# Patient Record
Sex: FEMALE | Race: WHITE | NOT HISPANIC OR LATINO | Employment: FULL TIME | URBAN - METROPOLITAN AREA
[De-identification: names, ages, dates, MRNs, and addresses within clinical notes are randomized per-mention and may not be internally consistent; named-entity substitution may affect disease eponyms.]

---

## 2020-09-11 PROBLEM — I47.20 VENTRICULAR TACHYCARDIA: Status: ACTIVE | Noted: 2020-09-11

## 2020-09-11 PROBLEM — E78.5 HYPERLIPIDEMIA: Status: ACTIVE | Noted: 2020-09-11

## 2020-09-11 PROBLEM — I10 ESSENTIAL HYPERTENSION: Status: ACTIVE | Noted: 2020-09-11

## 2020-09-11 PROBLEM — F32.A DEPRESSION: Status: ACTIVE | Noted: 2020-09-11

## 2020-10-06 PROBLEM — I10 ESSENTIAL HYPERTENSION: Status: RESOLVED | Noted: 2020-09-11 | Resolved: 2020-10-06

## 2020-11-29 PROBLEM — Z71.89 ACP (ADVANCE CARE PLANNING): Status: ACTIVE | Noted: 2020-11-29

## 2020-11-29 PROBLEM — I47.20 V TACH: Status: ACTIVE | Noted: 2020-11-29

## 2020-11-30 PROBLEM — I47.29 PAROXYSMAL VENTRICULAR TACHYCARDIA: Status: ACTIVE | Noted: 2020-11-30

## 2020-11-30 PROBLEM — I47.20 PAROXYSMAL VENTRICULAR TACHYCARDIA: Status: ACTIVE | Noted: 2020-11-30

## 2020-12-22 PROBLEM — I47.20 VENTRICULAR TACHYCARDIA: Status: RESOLVED | Noted: 2020-09-11 | Resolved: 2020-12-22

## 2020-12-22 PROBLEM — I47.20 V TACH: Status: RESOLVED | Noted: 2020-11-29 | Resolved: 2020-12-22

## 2021-05-25 PROBLEM — R42 DIZZINESS: Status: ACTIVE | Noted: 2021-05-25

## 2023-06-01 ENCOUNTER — OFFICE VISIT (OUTPATIENT)
Dept: NEUROLOGY | Facility: CLINIC | Age: 73
End: 2023-06-01
Payer: MEDICARE

## 2023-06-01 VITALS
SYSTOLIC BLOOD PRESSURE: 139 MMHG | HEIGHT: 66 IN | BODY MASS INDEX: 29.04 KG/M2 | HEART RATE: 75 BPM | DIASTOLIC BLOOD PRESSURE: 89 MMHG | WEIGHT: 180.69 LBS

## 2023-06-01 DIAGNOSIS — G60.9 HEREDITARY AND IDIOPATHIC NEUROPATHY, UNSPECIFIED: ICD-10-CM

## 2023-06-01 DIAGNOSIS — R79.9 ABNORMAL FINDING OF BLOOD CHEMISTRY, UNSPECIFIED: ICD-10-CM

## 2023-06-01 DIAGNOSIS — R20.2 PARESTHESIA: Primary | ICD-10-CM

## 2023-06-01 DIAGNOSIS — G25.81 RESTLESS LEG: ICD-10-CM

## 2023-06-01 PROCEDURE — 3008F PR BODY MASS INDEX (BMI) DOCUMENTED: ICD-10-PCS | Mod: CPTII,S$GLB,, | Performed by: NURSE PRACTITIONER

## 2023-06-01 PROCEDURE — 3075F SYST BP GE 130 - 139MM HG: CPT | Mod: CPTII,S$GLB,, | Performed by: NURSE PRACTITIONER

## 2023-06-01 PROCEDURE — 1101F PR PT FALLS ASSESS DOC 0-1 FALLS W/OUT INJ PAST YR: ICD-10-PCS | Mod: CPTII,S$GLB,, | Performed by: NURSE PRACTITIONER

## 2023-06-01 PROCEDURE — 1160F RVW MEDS BY RX/DR IN RCRD: CPT | Mod: CPTII,S$GLB,, | Performed by: NURSE PRACTITIONER

## 2023-06-01 PROCEDURE — 99999 PR PBB SHADOW E&M-EST. PATIENT-LVL IV: ICD-10-PCS | Mod: PBBFAC,,, | Performed by: NURSE PRACTITIONER

## 2023-06-01 PROCEDURE — 99999 PR PBB SHADOW E&M-EST. PATIENT-LVL IV: CPT | Mod: PBBFAC,,, | Performed by: NURSE PRACTITIONER

## 2023-06-01 PROCEDURE — 1126F AMNT PAIN NOTED NONE PRSNT: CPT | Mod: CPTII,S$GLB,, | Performed by: NURSE PRACTITIONER

## 2023-06-01 PROCEDURE — 1159F MED LIST DOCD IN RCRD: CPT | Mod: CPTII,S$GLB,, | Performed by: NURSE PRACTITIONER

## 2023-06-01 PROCEDURE — 3079F DIAST BP 80-89 MM HG: CPT | Mod: CPTII,S$GLB,, | Performed by: NURSE PRACTITIONER

## 2023-06-01 PROCEDURE — 3008F BODY MASS INDEX DOCD: CPT | Mod: CPTII,S$GLB,, | Performed by: NURSE PRACTITIONER

## 2023-06-01 PROCEDURE — 1160F PR REVIEW ALL MEDS BY PRESCRIBER/CLIN PHARMACIST DOCUMENTED: ICD-10-PCS | Mod: CPTII,S$GLB,, | Performed by: NURSE PRACTITIONER

## 2023-06-01 PROCEDURE — 1126F PR PAIN SEVERITY QUANTIFIED, NO PAIN PRESENT: ICD-10-PCS | Mod: CPTII,S$GLB,, | Performed by: NURSE PRACTITIONER

## 2023-06-01 PROCEDURE — 1159F PR MEDICATION LIST DOCUMENTED IN MEDICAL RECORD: ICD-10-PCS | Mod: CPTII,S$GLB,, | Performed by: NURSE PRACTITIONER

## 2023-06-01 PROCEDURE — 1101F PT FALLS ASSESS-DOCD LE1/YR: CPT | Mod: CPTII,S$GLB,, | Performed by: NURSE PRACTITIONER

## 2023-06-01 PROCEDURE — 99203 OFFICE O/P NEW LOW 30 MIN: CPT | Mod: S$GLB,,, | Performed by: NURSE PRACTITIONER

## 2023-06-01 PROCEDURE — 3288F PR FALLS RISK ASSESSMENT DOCUMENTED: ICD-10-PCS | Mod: CPTII,S$GLB,, | Performed by: NURSE PRACTITIONER

## 2023-06-01 PROCEDURE — 3075F PR MOST RECENT SYSTOLIC BLOOD PRESS GE 130-139MM HG: ICD-10-PCS | Mod: CPTII,S$GLB,, | Performed by: NURSE PRACTITIONER

## 2023-06-01 PROCEDURE — 99203 PR OFFICE/OUTPT VISIT, NEW, LEVL III, 30-44 MIN: ICD-10-PCS | Mod: S$GLB,,, | Performed by: NURSE PRACTITIONER

## 2023-06-01 PROCEDURE — 3288F FALL RISK ASSESSMENT DOCD: CPT | Mod: CPTII,S$GLB,, | Performed by: NURSE PRACTITIONER

## 2023-06-01 PROCEDURE — 3079F PR MOST RECENT DIASTOLIC BLOOD PRESSURE 80-89 MM HG: ICD-10-PCS | Mod: CPTII,S$GLB,, | Performed by: NURSE PRACTITIONER

## 2023-06-01 RX ORDER — PREDNISOLONE ACETATE 10 MG/ML
SUSPENSION/ DROPS OPHTHALMIC
COMMUNITY
Start: 2023-05-23 | End: 2023-09-29

## 2023-06-01 RX ORDER — IPRATROPIUM BROMIDE 42 UG/1
SPRAY, METERED NASAL
COMMUNITY
Start: 2023-01-18

## 2023-06-01 NOTE — PATIENT INSTRUCTIONS
EMG/NCS stands for electromyogram and nerve conduction studies. And this is an electrical test of your nerves and muscles. The purpose of the test is to localize where your symptoms are coming from. That can be pain, any kind of numbness and tingling, and/or weakness      Neuropathy is the medical term for nerve damage. Neuropathy is a common complication of type 1 and type 2 diabetes; up to 26 percent of people with type 2 diabetes have evidence of nerve damage at the time that diabetes is diagnosed.  A generalized type of neuropathy, known as polyneuropathy, is the most common type of diabetic neuropathy. Other types of neuropathy can also affect people with diabetes, but will not be discussed here.    Signs and symptoms of diabetic neuropathy include loss of sensation and/or burning pain in the feet. Early detection of diabetes and tight control of blood sugar levels may reduce the risk of developing diabetic neuropathy.    Treatments for diabetic neuropathy are available, and include several elements: control of blood glucose levels, prevention of injury, and control of painful symptoms.        Causes  Peripheral neuropathy is nerve damage caused by a number of different conditions. Health conditions that can cause peripheral neuropathy include:    Autoimmune diseases. These include Sjogren's syndrome, lupus, rheumatoid arthritis, Guillain-[place] syndrome, chronic inflammatory demyelinating polyneuropathy and vasculitis.  Diabetes. This is the most common cause. Among people with diabetes, more than halfwill develop some type of neuropathy.  Infections. These include certain viral or bacterial infections, including Lyme disease, shingles, Mik-Barr virus, hepatitis B and C, leprosy, diphtheria, and HIV.  Inherited disorders. Disorders such as Charcot-Elise-Tooth disease are hereditary types of neuropathy.  Tumors. Growths, cancerous (malignant) and noncancerous (benign), can develop on the nerves or press on  nerves. Also, polyneuropathy can arise as a result of some cancers related to the body's immune response. These are a form of a degenerative disorder called paraneoplastic syndrome.  Bone marrow disorders. These include an abnormal protein in the blood (monoclonal gammopathies), a form of bone cancer (myeloma), lymphoma and the rare disease amyloidosis.  Other diseases. These include kidney disease, liver disease, connective tissue disorders and an underactive thyroid (hypothyroidism).  Other causes of neuropathies include:    Alcoholism. Poor dietary choices made by people with alcoholism can lead to vitamin deficiencies.  Exposure to poisons. Toxic substances include industrial chemicals and heavy metals such as lead and mercury.  Medications. Certain medications, especially those used to treat cancer (chemotherapy), can cause peripheral neuropathy.  Injury or pressure on the nerve. Injuries, such as from motor vehicle accidents, falls or sports injuries, can sever or damage peripheral nerves. Nerve pressure can result from having a cast or using crutches or repeating a motion such as typing many times.  Vitamin deficiencies. B vitamins -- including B-1, B-6 and B-12 -- vitamin E and niacin are crucial to nerve health.

## 2023-06-01 NOTE — PROGRESS NOTES
NEUROLOGY  Outpatient CONSULT    [facility]  [address] [facility], [place] [address]  [phone] (office) / [phone] (fax)    Patient Name:  Jenise Montoya  :  1950  MR #:  [id number]  Acct #:  [id number]    Date of Neurology Consult: 2023  Name of Provider: AVE Perea    Other Physicians:  Mara Shaikh, PhD (Primary Care Physician); Idris Bardales III, MD (Referring)      Chief Complaint: Peripheral Neuropathy      History of Present Illness (HPI):  Jenise Montoya is a right handed 72 y.o. female with a PMHX ventricular tachy, HLD, Depression, Acid reflux    Patient is here today for burning sensation to both feet. This started roughly 3-4 months ago and at times will travel up to her lower legs. There is no associated weakness or falls. Her symptoms are worse in the mornings. Once she moves around she feels OK. This is bothersome at night.   She does endorse back pain fro the last 2 years. The pain does shoot down both legs. She is getting RACHNA next Friday. She has tried Gabapentin in the past but this made her sickly. She is currently not on any medication for this.     She denies history of autoimmune diseases, diabetes, alcoholism, smoking, viral/bacterial infections, family history of neuropathy, chemotherapy use, bone marrow disorders, kidney/liver diseases, underactive thyroid, vitamin deficiencies.   She did break her right ankle in . She did have surgery and recovered well.     She also has noticed the need to move her legs when sitting or mainly at night when laying down. This does not keep her awake. She reports it feels as though creepy crawling on her skin. Onset 3-4 mths ago.         Past Medical, Surgical, Family & Social History:   Past Medical History:   Diagnosis Date    Acid reflux     Depression     High cholesterol     Ventricular tachycardia      Past Surgical History:   Procedure Laterality Date    ANKLE FRACTURE SURGERY Right  2000    CARDIAC ELECTROPHYSIOLOGY STUDY  09/14/2020    Procedure: Study possible ablation;  Surgeon: Idris Bardales III, MD;  Location: [facility] include 234 beds at the [facility];  Service: Cardiology;;    CHOLECYSTECTOMY      FRACTURE SURGERY      HAND SURGERY      HAND SURGERY      NASAL FRACTURE SURGERY      NASAL SEPTUM SURGERY       Family History   Problem Relation Age of Onset    Heart disease Mother     Hypertension Mother     Diabetes Brother      Alcohol use:  reports no history of alcohol use.   (Of note, 0.6 oz = 1 beer or 6 oz = 10 beers).  Tobacco use:  reports that she has never smoked. She has never used smokeless tobacco.  Street drug use:  reports no history of drug use.  Allergies: Codeine sulfate.    Home Medications:     Current Outpatient Medications:     amiodarone (PACERONE) 200 MG Tab, TAKE 1 TABLET BY MOUTH EVERY DAY, Disp: 90 tablet, Rfl: 3    aspirin (ECOTRIN) 81 MG EC tablet, Take 1 tablet (81 mg total) by mouth once daily., Disp:  , Rfl:     atorvastatin (LIPITOR) 20 MG tablet, Take 20 mg by mouth once daily., Disp: , Rfl:     fluticasone propionate (FLONASE) 50 mcg/actuation nasal spray, 1 spray by Each Nostril route daily as needed for Allergies. , Disp: , Rfl:     multivit-min/iron/folic/lutein (CENTRUM SILVER WOMEN ORAL), Take 1 tablet by mouth once daily., Disp: , Rfl:     omeprazole (PRILOSEC) 40 MG capsule, Take 40 mg by mouth once daily., Disp: , Rfl:     prednisoLONE acetate (PRED FORTE) 1 % DrpS, Place into both eyes., Disp: , Rfl:     UNABLE TO FIND, Take 75 mg by mouth once daily. medication name: Shahzad, Disp: , Rfl:     venlafaxine (EFFEXOR-XR) 37.5 MG 24 hr capsule, Take 37.5 mg by mouth once daily., Disp: , Rfl:     verapamiL (VERELAN) 240 MG C24P, TAKE 1 CAPSULE BY MOUTH ONCE DAILY, Disp: 30 capsule, Rfl: 11    ipratropium (ATROVENT) 42 mcg (0.06 %) nasal spray, by Each Nostril route., Disp: , Rfl:     ondansetron (ZOFRAN-ODT) 8 MG TbDL, , Disp: , Rfl:     pregabalin (LYRICA) 50 MG capsule, Take 1  "capsule (50 mg total) by mouth 3 (three) times daily., Disp: 90 capsule, Rfl: 3    Physical Examination:  /89 (BP Location: Right arm, Patient Position: Sitting, BP Method: Medium (Automatic))   Pulse 75   Ht 5' 6" (1.676 m)   Wt 81.9 kg (180 lb 10.7 oz)   BMI 29.16 kg/m²     GENERAL:  General appearance: Well, non-toxic appearing.  No apparent distress.  Neck: supple.  .    MENTAL STATUS:  Alertness, attention span & concentration: normal.  Language: normal.  Orientation to self, place & time:  normal.  Memory, recent & remote: normal.  Fund of knowledge: normal.    SPEECH:  Clear and fluent.  Follows complex commands.      CRANIAL NERVES:  Cranial Nerves II-XII were examined.  II - Visual fields: normal.  III, IV, VI: PERRL, EOMI, No ptosis, No nystagmus.  V - Facial sensation: normal.  VII - Face symmetry & mobility: normal.  VIII - Hearing: normal  IX, X - Palate: mobile & midline.  XI - Shoulder shrug: normal.  XII - Tongue protrusion: normal.        GROSS MOTOR:  Gait & station: non focal; unable to perform tandem   Tone: normal.  Abnormal movements: none.  Finger-nose: normal.  Rapid alternating movements: normal.  Pronator drift: normal      MUSCLE STRENGTH:   Hand grasp:   - right:5/5   - left:5/5    RIGHT    LEFT   5 Neck Ext. 5   5 Neck Flex 5   5 Deltoids 5   5 Biceps 5   5 Triceps 5   5 Forearm.Pr. 5        5 Iliopsoas flex    5   5 Hip Abduct 5   5 Hip Adduct 5   5 Quads 5   5 Hams 5   5 Dorsiflex 5   5 Plantar Flex 5     REFLEXES:    RIGHT Reflex   LEFT   2+ Biceps 2+   2+ Brachiorad. 2+        3+ Patellar 2+      Left  Right    Ko absent  absent   Clonus Absent Absent   Babinski Absent Absent       SENSORY:  Light touch: Normal throughout.   Sharp touch: Normal throughout.  Vibration: decreased to right foot and ankle  Temperature: Normal throughout.   Joint Position: Normal throughout.  Proprioception: Intact      Diagnostic Data Reviewed:     Component      Latest Ref Rng & Units " 5/26/2021   Sodium      136 - 145 mmol/L 144   Potassium      3.5 - 5.1 mmol/L 4.2   Chloride      95 - 110 mmol/L 105   CO2      22 - 31 mmol/L 33 (H)   Glucose      70 - 110 mg/dL 116 (H)   BUN      7 - 18 mg/dL 18   Creatinine      0.50 - 1.40 mg/dL 1.04   Calcium      8.4 - 10.2 mg/dL 9.6   PROTEIN TOTAL      6.0 - 8.4 g/dL 6.5   Albumin      3.5 - 5.2 g/dL 4.0   BILIRUBIN TOTAL      0.2 - 1.3 mg/dL 0.3   Alkaline Phosphatase      38 - 145 U/L 120   AST      14 - 36 U/L 25   ALT      0 - 35 U/L 21   Anion Gap      8 - 16 mmol/L 6 (L)   eGFR if African American      >60 mL/min/1.73 m:2 >60   eGFR if non African American      >60 mL/min/1.73 m:2 54 (A)   Cholesterol      120 - 199 mg/dL 172   Triglycerides      30 - 150 mg/dL 96   HDL      40 - 75 mg/dL 72   LDL Cholesterol External      63.0 - 159.0 mg/dL 80.8   HDL/Cholesterol Ratio      20.0 - 50.0 % 41.9   Total Cholesterol/HDL Ratio      2.0 - 5.0 2.4   Non-HDL Cholesterol      mg/dL 100   TSH      0.400 - 4.000 uIU/mL 1.700               Assessment and Plan:  Jenise Montoya is a 72 y.o. female.    Problem List Items Addressed This Visit          Neuro    Restless leg    Current Assessment & Plan     Pt reports urge to move both legs when at rest. Moving around with offer relief.   This has not caused sleep disturbances  Obtain ferritin level   - if < 75 then plan to supplement with oral iron  Trial of Pregabalin may also offer aid.             Other    Paresthesia - Primary    Current Assessment & Plan     Patient is a 73 y/o female that presents for burning sensation. It is to bilateral feet with onset ~ 3-4 mths ago. It is worse at rest and improve when moving around. There is no associated weakness, falls etc.   Suspect neuropathy vs radiculopathy  Etiology unknown  Pt with h/o chronic back pain and actively following PM  Neuro exam with sensory deficit to right foot and ankle; strength intact  She has been trid on Gabapentin in the past for back pain  but this made her sick   - trial of Lyrica 50 mg TID; S/E discussed   - also try OTC creams  Obtain EMG/NCS and serologies           Other Visit Diagnoses       Hereditary and idiopathic neuropathy, unspecified        Abnormal finding of blood chemistry, unspecified                                Important to note, also  has a past medical history of Acid reflux, Depression, High cholesterol, and Ventricular tachycardia.            The patient will return to clinic in 2-3 months after testing        All questions were answered and patient is comfortable with the plan.       Thank you very much for the opportunity to assist in this patient's care.    If you have any questions or concerns, please do not hesitate to contact me at any time.    Sincerely,     AVE Perea  [facility] [place]         I spent a total of 39 minutes on the day of the visit.This includes face to face time and non-face to face time preparing to see the patient (eg, review of tests), Obtaining and/or reviewing separately obtained history, Documenting clinical information in the electronic or other health record, Independently interpreting resultsand communicating results to the patient/family/caregiver, or Care coordination.

## 2023-06-02 ENCOUNTER — LAB VISIT (OUTPATIENT)
Dept: LAB | Facility: HOSPITAL | Age: 73
End: 2023-06-02
Attending: NURSE PRACTITIONER
Payer: MEDICARE

## 2023-06-02 DIAGNOSIS — R20.2 PARESTHESIA: ICD-10-CM

## 2023-06-02 DIAGNOSIS — G25.81 RESTLESS LEG: ICD-10-CM

## 2023-06-02 DIAGNOSIS — G60.9 HEREDITARY AND IDIOPATHIC NEUROPATHY, UNSPECIFIED: ICD-10-CM

## 2023-06-02 DIAGNOSIS — R79.9 ABNORMAL FINDING OF BLOOD CHEMISTRY, UNSPECIFIED: ICD-10-CM

## 2023-06-02 LAB
BASOPHILS # BLD AUTO: 0.05 K/UL (ref 0–0.2)
BASOPHILS NFR BLD: 0.6 % (ref 0–1.9)
DIFFERENTIAL METHOD: ABNORMAL
EOSINOPHIL # BLD AUTO: 0.3 K/UL (ref 0–0.5)
EOSINOPHIL NFR BLD: 3.7 % (ref 0–8)
ERYTHROCYTE [DISTWIDTH] IN BLOOD BY AUTOMATED COUNT: 15.2 % (ref 11.5–14.5)
FERRITIN SERPL-MCNC: 17 NG/ML (ref 20–300)
HCT VFR BLD AUTO: 40.5 % (ref 37–48.5)
HGB BLD-MCNC: 12.8 G/DL (ref 12–16)
IMM GRANULOCYTES # BLD AUTO: 0.03 K/UL (ref 0–0.04)
IMM GRANULOCYTES NFR BLD AUTO: 0.4 % (ref 0–0.5)
LYMPHOCYTES # BLD AUTO: 2.2 K/UL (ref 1–4.8)
LYMPHOCYTES NFR BLD: 28.3 % (ref 18–48)
MCH RBC QN AUTO: 28.8 PG (ref 27–31)
MCHC RBC AUTO-ENTMCNC: 31.6 G/DL (ref 32–36)
MCV RBC AUTO: 91 FL (ref 82–98)
MONOCYTES # BLD AUTO: 0.6 K/UL (ref 0.3–1)
MONOCYTES NFR BLD: 7.3 % (ref 4–15)
NEUTROPHILS # BLD AUTO: 4.7 K/UL (ref 1.8–7.7)
NEUTROPHILS NFR BLD: 59.7 % (ref 38–73)
NRBC BLD-RTO: 0 /100 WBC
PATH REV BLD -IMP: NORMAL
PLATELET # BLD AUTO: 302 K/UL (ref 150–450)
PMV BLD AUTO: 11.9 FL (ref 9.2–12.9)
RBC # BLD AUTO: 4.45 M/UL (ref 4–5.4)
VIT B12 SERPL-MCNC: 431 PG/ML (ref 210–950)
WBC # BLD AUTO: 7.81 K/UL (ref 3.9–12.7)

## 2023-06-02 PROCEDURE — 85060 PATHOLOGIST REVIEW: ICD-10-PCS | Mod: ,,, | Performed by: PATHOLOGY

## 2023-06-02 PROCEDURE — 86235 NUCLEAR ANTIGEN ANTIBODY: CPT | Performed by: NURSE PRACTITIONER

## 2023-06-02 PROCEDURE — 85025 COMPLETE CBC W/AUTO DIFF WBC: CPT | Performed by: NURSE PRACTITIONER

## 2023-06-02 PROCEDURE — 84446 ASSAY OF VITAMIN E: CPT | Performed by: NURSE PRACTITIONER

## 2023-06-02 PROCEDURE — 85060 BLOOD SMEAR INTERPRETATION: CPT | Mod: ,,, | Performed by: PATHOLOGY

## 2023-06-02 PROCEDURE — 82607 VITAMIN B-12: CPT | Performed by: NURSE PRACTITIONER

## 2023-06-02 PROCEDURE — 86334 IMMUNOFIX E-PHORESIS SERUM: CPT | Mod: 26,,, | Performed by: PATHOLOGY

## 2023-06-02 PROCEDURE — 86334 PATHOLOGIST INTERPRETATION IFE: ICD-10-PCS | Mod: 26,,, | Performed by: PATHOLOGY

## 2023-06-02 PROCEDURE — 84425 ASSAY OF VITAMIN B-1: CPT | Performed by: NURSE PRACTITIONER

## 2023-06-02 PROCEDURE — 86235 NUCLEAR ANTIGEN ANTIBODY: CPT | Mod: 59 | Performed by: NURSE PRACTITIONER

## 2023-06-02 PROCEDURE — 84630 ASSAY OF ZINC: CPT | Performed by: NURSE PRACTITIONER

## 2023-06-02 PROCEDURE — 84207 ASSAY OF VITAMIN B-6: CPT | Performed by: NURSE PRACTITIONER

## 2023-06-02 PROCEDURE — 82728 ASSAY OF FERRITIN: CPT | Performed by: NURSE PRACTITIONER

## 2023-06-02 PROCEDURE — 86334 IMMUNOFIX E-PHORESIS SERUM: CPT | Performed by: NURSE PRACTITIONER

## 2023-06-02 PROCEDURE — 36415 COLL VENOUS BLD VENIPUNCTURE: CPT | Mod: PO | Performed by: NURSE PRACTITIONER

## 2023-06-02 PROCEDURE — 84165 PROTEIN E-PHORESIS SERUM: CPT | Performed by: NURSE PRACTITIONER

## 2023-06-02 PROCEDURE — 84165 PATHOLOGIST INTERPRETATION SPE: ICD-10-PCS | Mod: 26,,, | Performed by: PATHOLOGY

## 2023-06-02 PROCEDURE — 83921 ORGANIC ACID SINGLE QUANT: CPT | Performed by: NURSE PRACTITIONER

## 2023-06-02 PROCEDURE — 86038 ANTINUCLEAR ANTIBODIES: CPT | Performed by: NURSE PRACTITIONER

## 2023-06-02 PROCEDURE — 84165 PROTEIN E-PHORESIS SERUM: CPT | Mod: 26,,, | Performed by: PATHOLOGY

## 2023-06-02 PROCEDURE — 86039 ANTINUCLEAR ANTIBODIES (ANA): CPT | Performed by: NURSE PRACTITIONER

## 2023-06-02 RX ORDER — PREGABALIN 50 MG/1
50 CAPSULE ORAL 3 TIMES DAILY
Qty: 90 CAPSULE | Refills: 3 | Status: SHIPPED | OUTPATIENT
Start: 2023-06-02 | End: 2023-09-29

## 2023-06-02 NOTE — ASSESSMENT & PLAN NOTE
Pt reports urge to move both legs when at rest. Moving around with offer relief.   This has not caused sleep disturbances  Obtain ferritin level   - if < 75 then plan to supplement with oral iron  Trial of Pregabalin may also offer aid.

## 2023-06-02 NOTE — ASSESSMENT & PLAN NOTE
Patient is a 73 y/o female that presents for burning sensation. It is to bilateral feet with onset ~ 3-4 mths ago. It is worse at rest and improve when moving around. There is no associated weakness, falls etc.   Suspect neuropathy vs radiculopathy  Etiology unknown  Pt with h/o chronic back pain and actively following PM  Neuro exam with sensory deficit to right foot and ankle; strength intact  She has been trid on Gabapentin in the past for back pain but this made her sick   - trial of Lyrica 50 mg TID; S/E discussed   - also try OTC creams  Obtain EMG/NCS and serologies

## 2023-06-05 LAB
ALBUMIN SERPL ELPH-MCNC: 3.94 G/DL (ref 3.35–5.55)
ALPHA1 GLOB SERPL ELPH-MCNC: 0.3 G/DL (ref 0.17–0.41)
ALPHA2 GLOB SERPL ELPH-MCNC: 0.94 G/DL (ref 0.43–0.99)
ANA PATTERN 1: NORMAL
ANA SER QL IF: POSITIVE
ANA TITR SER IF: NORMAL {TITER}
B-GLOBULIN SERPL ELPH-MCNC: 0.94 G/DL (ref 0.5–1.1)
GAMMA GLOB SERPL ELPH-MCNC: 0.78 G/DL (ref 0.67–1.58)
INTERPRETATION SERPL IFE-IMP: NORMAL
PATH REV BLD -IMP: NORMAL
PROT SERPL-MCNC: 6.9 G/DL (ref 6–8.4)
ZINC SERPL-MCNC: 113 UG/DL (ref 60–130)

## 2023-06-06 LAB
ANTI SM ANTIBODY: 0.1 RATIO (ref 0–0.99)
ANTI SM/RNP ANTIBODY: 0.13 RATIO (ref 0–0.99)
ANTI-SM INTERPRETATION: NEGATIVE
ANTI-SM/RNP INTERPRETATION: NEGATIVE
ANTI-SSA ANTIBODY: 0.09 RATIO (ref 0–0.99)
ANTI-SSA ANTIBODY: 0.09 RATIO (ref 0–0.99)
ANTI-SSA INTERPRETATION: NEGATIVE
ANTI-SSA INTERPRETATION: NEGATIVE
ANTI-SSB ANTIBODY: 0.06 RATIO (ref 0–0.99)
ANTI-SSB ANTIBODY: 0.06 RATIO (ref 0–0.99)
ANTI-SSB INTERPRETATION: NEGATIVE
ANTI-SSB INTERPRETATION: NEGATIVE
DSDNA AB SER-ACNC: NORMAL [IU]/ML
METHYLMALONATE SERPL-SCNC: 0.29 UMOL/L
PATHOLOGIST INTERPRETATION IFE: NORMAL
PATHOLOGIST INTERPRETATION SPE: NORMAL
VIT B1 BLD-MCNC: 81 UG/L (ref 38–122)

## 2023-06-07 ENCOUNTER — TELEPHONE (OUTPATIENT)
Dept: NEUROLOGY | Facility: CLINIC | Age: 73
End: 2023-06-07
Payer: MEDICARE

## 2023-06-07 ENCOUNTER — TELEPHONE (OUTPATIENT)
Dept: HEMATOLOGY/ONCOLOGY | Facility: CLINIC | Age: 73
End: 2023-06-07
Payer: MEDICARE

## 2023-06-07 DIAGNOSIS — R79.0 LOW FERRITIN LEVEL: Primary | ICD-10-CM

## 2023-06-07 LAB
A-TOCOPHEROL VIT E SERPL-MCNC: 1346 UG/DL (ref 500–1800)
PYRIDOXAL SERPL-MCNC: 9 UG/L (ref 5–50)

## 2023-06-07 NOTE — TELEPHONE ENCOUNTER
----- Message from MORENO Villar sent at 6/7/2023 12:41 PM CDT -----  Please inform the patient that her Iron level is low. Id like her to start Iron supplements but also would like for her to see a hematologist to explore why this is.   Please start taking Ferrous sulfate 325 mg 1 tablet daily. This can be purchased over the counter.  Referral placed for hematology

## 2023-06-07 NOTE — TELEPHONE ENCOUNTER
Called pt and discussed hem referral.  Pt stated she will try the OTC iron supplements for a few months to see if this helps. Decline hem appt at this time.

## 2023-07-10 ENCOUNTER — OFFICE VISIT (OUTPATIENT)
Dept: PHYSICAL MEDICINE AND REHAB | Facility: CLINIC | Age: 73
End: 2023-07-10
Payer: MEDICARE

## 2023-07-10 VITALS — WEIGHT: 180.56 LBS | BODY MASS INDEX: 29.02 KG/M2 | HEIGHT: 66 IN

## 2023-07-10 DIAGNOSIS — R20.2 PARESTHESIA: ICD-10-CM

## 2023-07-10 DIAGNOSIS — M54.9 BACK PAIN, UNSPECIFIED BACK LOCATION, UNSPECIFIED BACK PAIN LATERALITY, UNSPECIFIED CHRONICITY: ICD-10-CM

## 2023-07-10 PROCEDURE — 95886 PR EMG COMPLETE, W/ NERVE CONDUCTION STUDIES, 5+ MUSCLES: ICD-10-PCS | Mod: S$GLB,,, | Performed by: PHYSICAL MEDICINE & REHABILITATION

## 2023-07-10 PROCEDURE — 99999 PR PBB SHADOW E&M-EST. PATIENT-LVL II: ICD-10-PCS | Mod: PBBFAC,,, | Performed by: PHYSICAL MEDICINE & REHABILITATION

## 2023-07-10 PROCEDURE — 99999 PR PBB SHADOW E&M-EST. PATIENT-LVL II: CPT | Mod: PBBFAC,,, | Performed by: PHYSICAL MEDICINE & REHABILITATION

## 2023-07-10 PROCEDURE — 95910 NRV CNDJ TEST 7-8 STUDIES: CPT | Mod: S$GLB,,, | Performed by: PHYSICAL MEDICINE & REHABILITATION

## 2023-07-10 PROCEDURE — 95910 PR NERVE CONDUCTION STUDY; 7-8 STUDIES: ICD-10-PCS | Mod: S$GLB,,, | Performed by: PHYSICAL MEDICINE & REHABILITATION

## 2023-07-10 PROCEDURE — 99499 UNLISTED E&M SERVICE: CPT | Mod: S$GLB,,, | Performed by: PHYSICAL MEDICINE & REHABILITATION

## 2023-07-10 PROCEDURE — 95886 MUSC TEST DONE W/N TEST COMP: CPT | Mod: S$GLB,,, | Performed by: PHYSICAL MEDICINE & REHABILITATION

## 2023-07-10 PROCEDURE — 99499 NO LOS: ICD-10-PCS | Mod: S$GLB,,, | Performed by: PHYSICAL MEDICINE & REHABILITATION

## 2023-07-10 NOTE — PROGRESS NOTES
[facility]  [address] [place] [address]             Full Name: Jenise Montoya Gender: Female  Patient ID: [id number] YOB: 1950  History: Bilateral foot burning sensations. Chronic back pain. No hx of back surgery.       Visit Date: 7/10/2023 11:19 AM  Age: 72 Years  Examining Physician: Matilda Briscoe DO  Referring Physician: Maryann Dennis      Sensory NCS      Nerve / Sites Rec. Site Onset Lat Peak Lat NP Amp PP Amp Segments Distance Velocity     ms ms µV µV  cm m/s   R Sural - Ankle (Calf)      Calf Ankle 3.46 4.10 9.7 8.5 Calf - Ankle 14 40   L Sural - Ankle (Calf)      Calf Ankle 3.23 4.19 8.2 8.0 Calf - Ankle 14 43   L Superficial peroneal - Ankle      Lat leg Ankle 3.10 3.96 6.0 3.0 Lat leg - Ankle 14 45   R Superficial peroneal - Ankle      Lat leg Ankle 3.40 4.02 6.2 8.2 Lat leg - Ankle 14 41       Motor NCS      Nerve / Sites Muscle Latency Amplitude Amp % Duration Segments Distance Lat Diff Velocity     ms mV % ms  cm ms m/s   L Peroneal - EDB      Ankle EDB 5.46 3.2 100 6.21 Ankle - EDB 8        Fib head EDB 12.23 3.0 92 6.31 Fib head - Ankle 30 6.77 44      Pop fossa EDB 13.56 3.0 92.5 6.29 Pop fossa - Fib head 7 1.33 52   R Peroneal - EDB      Ankle EDB 4.81 3.8 100 6.08 Ankle - EDB 8        Fib head EDB 11.48 3.1 81.8 6.85 Fib head - Ankle 28 6.67 42      Pop fossa EDB 12.98 3.3 86.3 6.79 Pop fossa - Fib head 8 1.50 53   R Tibial - AH      Ankle AH 3.79 6.8 100 4.67 Ankle - AH 8        Pop fossa AH 12.56 5.0 73.7 4.94 Pop fossa - Ankle 38 8.77 43   L Tibial - AH      Ankle AH 3.50 9.3 100 4.92 Ankle - AH 8        Pop fossa AH 12.38 6.9 73.9 5.29 Pop fossa - Ankle 39 8.88 44       EMG Summary Table     Spontaneous MUAP Recruitment   Muscle IA Fib PSW Fasc CRD Amp Dur. Poly Pattern   L. Quadriceps N None None None None N N None N   L. Tibialis anterior N None None None None N N None N   L. Peroneus longus N None None None None N N None N   L. Gastrocnemius  (Medial head) N None None None None N N None N   L. Gluteus medius N None None None None N N None N   R. Quadriceps N None None None None N N None N   R. Tibialis anterior N None None None None N N None N   R. Peroneus longus N None None None None N N None N   R. Gastrocnemius (Medial head) N None None None None N N None N   R. Gluteus medius N None None None None N N None N       Summary    The motor conduction test was normal in all 4 of the tested nerves: L Peroneal - EDB, R Peroneal - EDB, R Tibial - AH, L Tibial - AH.    The sensory conduction test was normal in all 4 of the tested nerves: R Sural - Ankle (Calf), L Sural - Ankle (Calf), L Superficial peroneal - Ankle, R Superficial peroneal - Ankle.    The needle EMG study was normal in all 10 tested muscles: L. Quadriceps, L. Tibialis anterior, L. Peroneus longus, L. Gastrocnemius (Medial head), L. Gluteus medius, R. Quadriceps, R. Tibialis anterior, R. Peroneus longus, R. Gastrocnemius (Medial head), R. Gluteus medius.       Impression:  Normal study.  No electrophysiologic evidence of bilateral lumbosacral radiculopathy/plexopathy, bilateral tibial mononeuropathy, bilateral peroneal mononeuropathy, or peripheral neuropathy in the lower extremities.    ------------------------------  Matilda Briscoe, DO

## 2023-12-26 ENCOUNTER — TELEPHONE (OUTPATIENT)
Dept: NEUROLOGY | Facility: CLINIC | Age: 73
End: 2023-12-26
Payer: MEDICARE

## 2023-12-26 NOTE — TELEPHONE ENCOUNTER
----- Message from Jayden Caputo sent at 12/26/2023 10:05 AM CST -----  Type:  Sooner Appointment Request    Caller is requesting a sooner appointment.  Caller declined first available appointment listed below.  Caller will not accept being placed on the waitlist and is requesting a message be sent to doctor.    Name of Caller:  pt  When is the first available appointment?   NA  Symptoms:  tested positive for ANDREIA  Would the patient rather a call back or a response via [facility]?  Call back  Best Call Back Number:   [phone]  Additional Information:  pt pcp advised her to schedule an appt, pl call bk to advise thanks

## 2023-12-26 NOTE — TELEPHONE ENCOUNTER
Called and spoke to pt.  She stated her ANDREIA labs were abnormal and her provider informed her to see hematologist.  Labs were done in June when the hem referral was created.  Scheduled benign hem appt for Mar 7th, first avail hem appt; pt confirmed the appt date time and location.     E-Consult info sent to referring physician, Maryann Dennis NP, Neuro.

## 2024-04-12 PROBLEM — I35.8 SYSTOLIC MURMUR OF AORTA: Status: ACTIVE | Noted: 2024-04-12

## 2024-04-12 PROBLEM — R42 DIZZINESS: Status: RESOLVED | Noted: 2021-05-25 | Resolved: 2024-04-12

## 2024-07-26 DIAGNOSIS — U07.1 COVID-19 VIRUS DETECTED: ICD-10-CM

## 2025-06-18 ENCOUNTER — OFFICE VISIT (OUTPATIENT)
Dept: OPHTHALMOLOGY | Facility: CLINIC | Age: 75
End: 2025-06-18
Payer: MEDICARE

## 2025-06-18 DIAGNOSIS — H25.13 NUCLEAR SCLEROTIC CATARACT OF BOTH EYES: ICD-10-CM

## 2025-06-18 DIAGNOSIS — H43.813 POSTERIOR VITREOUS DEGENERATION, BILATERAL: ICD-10-CM

## 2025-06-18 DIAGNOSIS — H04.123 DRY EYE SYNDROME OF BOTH EYES: ICD-10-CM

## 2025-06-18 DIAGNOSIS — H10.10 ALLERGIC CONJUNCTIVITIS, UNSPECIFIED LATERALITY: Primary | ICD-10-CM

## 2025-06-18 PROCEDURE — 1101F PT FALLS ASSESS-DOCD LE1/YR: CPT | Mod: CPTII,S$GLB,, | Performed by: STUDENT IN AN ORGANIZED HEALTH CARE EDUCATION/TRAINING PROGRAM

## 2025-06-18 PROCEDURE — 1126F AMNT PAIN NOTED NONE PRSNT: CPT | Mod: CPTII,S$GLB,, | Performed by: STUDENT IN AN ORGANIZED HEALTH CARE EDUCATION/TRAINING PROGRAM

## 2025-06-18 PROCEDURE — 1159F MED LIST DOCD IN RCRD: CPT | Mod: CPTII,S$GLB,, | Performed by: STUDENT IN AN ORGANIZED HEALTH CARE EDUCATION/TRAINING PROGRAM

## 2025-06-18 PROCEDURE — 99204 OFFICE O/P NEW MOD 45 MIN: CPT | Mod: S$GLB,,, | Performed by: STUDENT IN AN ORGANIZED HEALTH CARE EDUCATION/TRAINING PROGRAM

## 2025-06-18 PROCEDURE — 3288F FALL RISK ASSESSMENT DOCD: CPT | Mod: CPTII,S$GLB,, | Performed by: STUDENT IN AN ORGANIZED HEALTH CARE EDUCATION/TRAINING PROGRAM

## 2025-06-18 PROCEDURE — 99999 PR PBB SHADOW E&M-EST. PATIENT-LVL III: CPT | Mod: PBBFAC,,, | Performed by: STUDENT IN AN ORGANIZED HEALTH CARE EDUCATION/TRAINING PROGRAM

## 2025-06-18 PROCEDURE — 92134 CPTRZ OPH DX IMG PST SGM RTA: CPT | Mod: S$GLB,,, | Performed by: STUDENT IN AN ORGANIZED HEALTH CARE EDUCATION/TRAINING PROGRAM

## 2025-06-18 PROCEDURE — 1160F RVW MEDS BY RX/DR IN RCRD: CPT | Mod: CPTII,S$GLB,, | Performed by: STUDENT IN AN ORGANIZED HEALTH CARE EDUCATION/TRAINING PROGRAM

## 2025-06-18 RX ORDER — OLOPATADINE HYDROCHLORIDE 1 MG/ML
1 SOLUTION OPHTHALMIC 2 TIMES DAILY
Qty: 5 ML | Refills: 3 | Status: SHIPPED | OUTPATIENT
Start: 2025-06-18 | End: 2026-06-18

## 2025-06-18 NOTE — PROGRESS NOTES
HPI    Cataract eval/last seen my eye doctor last fall    Pt noticing decrease in vision OU. Problems at night with glare.    Denies F/F.    OU itchy and tear all the time. Using sytane OU every day  Last edited by Josefa Llamas on 6/18/2025 10:25 AM.            Assessment /Plan     For exam results, see Encounter Report.    Allergic conjunctivitis, unspecified laterality    Dry eye syndrome of both eyes    Nuclear sclerotic cataract of both eyes    Posterior vitreous degeneration, bilateral  -     OCT, Retina - OU - Both Eyes    Other orders  -     olopatadine (PATANOL) 0.1 % ophthalmic solution; Place 1 drop into both eyes 2 (two) times daily.  Dispense: 5 mL; Refill: 3      Significant allergies requiring allergy shots x 2 years. Eye itching and irritation x 1 yr. Has not tried oloptadine yet--will start BID OU.   Start artificial tears, preservative free preferred, 2-4x/day  Appear visually significant OD>OS, but address ocular surface first. RTC 3 months for re-eval.   Normal DFE and OCT-mac. CTM.    RTC 3 months allergy/dry eye f/u, cat re-eval OD